# Patient Record
Sex: FEMALE | Race: WHITE | NOT HISPANIC OR LATINO | Employment: OTHER | URBAN - METROPOLITAN AREA
[De-identification: names, ages, dates, MRNs, and addresses within clinical notes are randomized per-mention and may not be internally consistent; named-entity substitution may affect disease eponyms.]

---

## 2024-05-07 ENCOUNTER — TRANSFERRED RECORDS (OUTPATIENT)
Dept: HEALTH INFORMATION MANAGEMENT | Facility: CLINIC | Age: 64
End: 2024-05-07
Payer: MEDICARE

## 2024-05-10 ENCOUNTER — TRANSFERRED RECORDS (OUTPATIENT)
Dept: HEALTH INFORMATION MANAGEMENT | Facility: CLINIC | Age: 64
End: 2024-05-10
Payer: MEDICARE

## 2024-05-14 ENCOUNTER — TRANSCRIBE ORDERS (OUTPATIENT)
Dept: OTHER | Age: 64
End: 2024-05-14

## 2024-05-14 DIAGNOSIS — R82.998 OTHER ABNORMAL FINDINGS IN URINE: Primary | ICD-10-CM

## 2024-05-15 ENCOUNTER — TELEPHONE (OUTPATIENT)
Dept: NEPHROLOGY | Facility: CLINIC | Age: 64
End: 2024-05-15
Payer: MEDICARE

## 2024-05-15 NOTE — TELEPHONE ENCOUNTER
[facility] Call Center    Phone Message    May a detailed message be left on voicemail: yes     Reason for Call: Appointment Intake    Referring Provider Name: Cindy Quiroga MD   Diagnosis and/or Symptoms: Other abnormal findings in urine [R82.998] (Priority 1-2 weeks)    Patient is calling to schedule a referral we got from [facility]. Patient states the referral we got did not describe the whole reason for this visit. Patient is sharing more info regarding reason for visit:     Hematuria (Gross), Cytology report back with renal cells in urine sediment. Had CT w/contrast done with [facility]. report of low densitiy lesion (tiny cyst).    Please call patient back to discuss.    Action Taken: Other: [place] - Nephrology    Travel Screening: Not Applicable

## 2024-05-22 NOTE — TELEPHONE ENCOUNTER
Patient called back, discussed her referral. States that she has been having blood in her urine since March, they treated and initial UTI, but hasn't had reoccurrence of that and still having blood, was referred to urology, did CT scan was done and showed possible kidney stones.     Referral states:gross hematuria. Negative cysto and CT urogram. Casts on UA and renal tubular cells on cytology     Referral priority states 1-2 weeks; there is no availability for this. Sent to provider for review

## 2024-05-24 ENCOUNTER — PRE VISIT (OUTPATIENT)
Dept: UROLOGY | Facility: CLINIC | Age: 64
End: 2024-05-24
Payer: MEDICARE

## 2024-05-24 NOTE — TELEPHONE ENCOUNTER
Reason for visit: consult for      Dx/Hx/Sx: renal cyst     Records/imaging/labs/orders: in epic     At Rooming: standard

## 2024-05-28 NOTE — TELEPHONE ENCOUNTER
Stephanie Hoff MD  You4 days ago     MB  It seems to be that she had blood which is resolve,d has normal kidney function and no protein in the urine, and historically did not have blood in the urine , so I do not suspect any GN  Her renal cyst can be followed up by urology typically.  I am not sure we need to see her, but if we have nothing available, can see if [facility] can see her sooner    Thank

## 2024-05-28 NOTE — TELEPHONE ENCOUNTER
Called patient, relayed recommendations from MD. She asked for the number for [facility] and plans to follow up with urology. States she has had obnoxious smelling urine lately; writer educated that she follow up with urology regarding this since it can be a UTI sx.   She verbalized understanding.

## 2024-06-04 ENCOUNTER — PRE VISIT (OUTPATIENT)
Dept: UROLOGY | Facility: CLINIC | Age: 64
End: 2024-06-04